# Patient Record
Sex: FEMALE | Race: BLACK OR AFRICAN AMERICAN | NOT HISPANIC OR LATINO | URBAN - METROPOLITAN AREA
[De-identification: names, ages, dates, MRNs, and addresses within clinical notes are randomized per-mention and may not be internally consistent; named-entity substitution may affect disease eponyms.]

---

## 2017-03-02 ENCOUNTER — HISTORICAL (OUTPATIENT)
Dept: RADIOLOGY | Facility: HOSPITAL | Age: 72
End: 2017-03-02

## 2018-11-07 ENCOUNTER — HISTORICAL (OUTPATIENT)
Dept: RADIOLOGY | Facility: HOSPITAL | Age: 73
End: 2018-11-07

## 2018-11-20 ENCOUNTER — HISTORICAL (OUTPATIENT)
Dept: RADIOLOGY | Facility: HOSPITAL | Age: 73
End: 2018-11-20

## 2019-12-05 ENCOUNTER — HISTORICAL (OUTPATIENT)
Dept: RADIOLOGY | Facility: HOSPITAL | Age: 74
End: 2019-12-05

## 2020-12-11 ENCOUNTER — HISTORICAL (OUTPATIENT)
Dept: RADIOLOGY | Facility: HOSPITAL | Age: 75
End: 2020-12-11

## 2021-12-15 ENCOUNTER — HISTORICAL (OUTPATIENT)
Dept: RADIOLOGY | Facility: HOSPITAL | Age: 76
End: 2021-12-15

## 2022-12-30 ENCOUNTER — HOSPITAL ENCOUNTER (OUTPATIENT)
Dept: RADIOLOGY | Facility: HOSPITAL | Age: 77
Discharge: HOME | End: 2022-12-30
Attending: OBSTETRICS & GYNECOLOGY
Payer: MEDICARE

## 2022-12-30 DIAGNOSIS — Z12.31 BREAST CANCER SCREENING BY MAMMOGRAM: ICD-10-CM

## 2022-12-30 PROCEDURE — 77063 MAMMO DIGITAL SCREENING BILAT WITH TOMO: ICD-10-PCS | Mod: 26,,, | Performed by: STUDENT IN AN ORGANIZED HEALTH CARE EDUCATION/TRAINING PROGRAM

## 2022-12-30 PROCEDURE — 77067 SCR MAMMO BI INCL CAD: CPT | Mod: 26,,, | Performed by: STUDENT IN AN ORGANIZED HEALTH CARE EDUCATION/TRAINING PROGRAM

## 2022-12-30 PROCEDURE — 77063 BREAST TOMOSYNTHESIS BI: CPT | Mod: 26,,, | Performed by: STUDENT IN AN ORGANIZED HEALTH CARE EDUCATION/TRAINING PROGRAM

## 2022-12-30 PROCEDURE — 77067 MAMMO DIGITAL SCREENING BILAT WITH TOMO: ICD-10-PCS | Mod: 26,,, | Performed by: STUDENT IN AN ORGANIZED HEALTH CARE EDUCATION/TRAINING PROGRAM

## 2022-12-30 PROCEDURE — 77063 BREAST TOMOSYNTHESIS BI: CPT | Mod: TC

## 2024-12-16 ENCOUNTER — HOSPITAL ENCOUNTER (EMERGENCY)
Facility: HOSPITAL | Age: 79
Discharge: HOME | End: 2024-12-16
Attending: EMERGENCY MEDICINE
Payer: MEDICARE

## 2024-12-16 VITALS
DIASTOLIC BLOOD PRESSURE: 100 MMHG | HEIGHT: 67 IN | SYSTOLIC BLOOD PRESSURE: 158 MMHG | BODY MASS INDEX: 33.27 KG/M2 | WEIGHT: 212 LBS | RESPIRATION RATE: 18 BRPM | TEMPERATURE: 98 F | HEART RATE: 100 BPM | OXYGEN SATURATION: 98 %

## 2024-12-16 DIAGNOSIS — M25.561 BILATERAL KNEE PAIN: ICD-10-CM

## 2024-12-16 DIAGNOSIS — M25.562 BILATERAL KNEE PAIN: ICD-10-CM

## 2024-12-16 DIAGNOSIS — M17.0 BILATERAL PRIMARY OSTEOARTHRITIS OF KNEE: Primary | ICD-10-CM

## 2024-12-16 PROCEDURE — 63600175 PHARM REV CODE 636 W HCPCS: Performed by: EMERGENCY MEDICINE

## 2024-12-16 PROCEDURE — 96372 THER/PROPH/DIAG INJ SC/IM: CPT

## 2024-12-16 PROCEDURE — 96372 THER/PROPH/DIAG INJ SC/IM: CPT | Performed by: EMERGENCY MEDICINE

## 2024-12-16 PROCEDURE — 99284 EMERGENCY DEPT VISIT MOD MDM: CPT | Mod: 25

## 2024-12-16 RX ORDER — PAROXETINE HYDROCHLORIDE 40 MG/1
40 TABLET, FILM COATED ORAL DAILY
COMMUNITY
Start: 2024-12-11

## 2024-12-16 RX ORDER — GLIMEPIRIDE 4 MG/1
4 TABLET ORAL 2 TIMES DAILY
COMMUNITY

## 2024-12-16 RX ORDER — MELOXICAM 7.5 MG/1
7.5 TABLET ORAL DAILY
Qty: 14 TABLET | Refills: 0 | Status: SHIPPED | OUTPATIENT
Start: 2024-12-16 | End: 2024-12-30

## 2024-12-16 RX ORDER — QUETIAPINE FUMARATE 25 MG/1
25-50 TABLET, FILM COATED ORAL NIGHTLY
COMMUNITY

## 2024-12-16 RX ORDER — LORAZEPAM 1 MG/1
1 TABLET ORAL 2 TIMES DAILY
COMMUNITY
Start: 2024-12-11

## 2024-12-16 RX ORDER — KETOROLAC TROMETHAMINE 30 MG/ML
30 INJECTION, SOLUTION INTRAMUSCULAR; INTRAVENOUS
Status: COMPLETED | OUTPATIENT
Start: 2024-12-16 | End: 2024-12-16

## 2024-12-16 RX ORDER — ESCITALOPRAM OXALATE 20 MG/1
20 TABLET ORAL
COMMUNITY
Start: 2021-12-29

## 2024-12-16 RX ORDER — HYDROCODONE BITARTRATE AND ACETAMINOPHEN 5; 325 MG/1; MG/1
1 TABLET ORAL EVERY 6 HOURS PRN
Qty: 20 TABLET | Refills: 0 | Status: SHIPPED | OUTPATIENT
Start: 2024-12-16 | End: 2024-12-21

## 2024-12-16 RX ORDER — GABAPENTIN 400 MG/1
400 CAPSULE ORAL 3 TIMES DAILY
COMMUNITY

## 2024-12-16 RX ADMIN — KETOROLAC TROMETHAMINE 30 MG: 30 INJECTION, SOLUTION INTRAMUSCULAR at 02:12

## 2024-12-16 NOTE — ED TRIAGE NOTES
Pt complaint of recurrent bilateral knee pain over the past few months that is not improving. Pt denies recent  injury

## 2024-12-16 NOTE — ED PROVIDER NOTES
Encounter Date: 12/16/2024       History     Chief Complaint   Patient presents with    Knee Pain     Pt complaint of recurrent bilateral knee pain over the past few months that is not improving. Pt denies recent  injury     The history is provided by the patient.   Knee Pain  This is a chronic problem. The current episode started more than 1 week ago. The problem occurs constantly. The problem has been gradually worsening. Pertinent negatives include no chest pain and no shortness of breath. The symptoms are aggravated by walking and standing. Nothing relieves the symptoms. She has tried ASA for the symptoms. The treatment provided no relief.   Has not seen anyone for her knee pain, citing her ill  that she must tend to at home.    Review of patient's allergies indicates:  No Known Allergies  Past Medical History:   Diagnosis Date    Diabetes mellitus      History reviewed. No pertinent surgical history.  No family history on file.  Social History     Tobacco Use    Smoking status: Never     Passive exposure: Never    Smokeless tobacco: Never   Substance Use Topics    Alcohol use: Never    Drug use: Never     Review of Systems   Constitutional:  Negative for fever.   HENT:  Negative for sore throat.    Respiratory:  Negative for shortness of breath.    Cardiovascular:  Negative for chest pain.   Gastrointestinal:  Negative for nausea.   Genitourinary:  Negative for dysuria.   Musculoskeletal:  Negative for back pain.   Skin:  Negative for rash.   Neurological:  Negative for weakness.   Hematological:  Does not bruise/bleed easily.       Physical Exam     Initial Vitals [12/16/24 1251]   BP Pulse Resp Temp SpO2   (!) 158/100 100 18 98.2 °F (36.8 °C) 98 %      MAP       --         Physical Exam    Nursing note and vitals reviewed.  Constitutional: She appears well-developed and well-nourished.   HENT:   Head: Normocephalic and atraumatic.   Right Ear: External ear normal.   Left Ear: External ear normal.    Eyes: Conjunctivae and EOM are normal. Pupils are equal, round, and reactive to light.   Neck: Neck supple.   Normal range of motion.  Cardiovascular:  Normal rate, regular rhythm, normal heart sounds and intact distal pulses.           Pulmonary/Chest: Breath sounds normal.   Abdominal: Abdomen is soft. Bowel sounds are normal.   obese   Musculoskeletal:         General: Normal range of motion.      Cervical back: Normal range of motion and neck supple.      Right knee: Deformity (valgus) present.      Left knee: Deformity (valgus) present.     Neurological: She is alert and oriented to person, place, and time. GCS score is 15. GCS eye subscore is 4. GCS verbal subscore is 5. GCS motor subscore is 6.   Skin: Skin is warm and dry. Capillary refill takes less than 2 seconds.   Psychiatric: She has a normal mood and affect. Her behavior is normal. Judgment and thought content normal.         ED Course   Procedures  Labs Reviewed - No data to display       Imaging Results              X-Ray Knee AP Standing Bilateral (Final result)  Result time 12/16/24 14:29:06      Final result by Ciro Worthington MD (12/16/24 14:29:06)                   Impression:      Degenerative changes.      Electronically signed by: Ciro Worthington  Date:    12/16/2024  Time:    14:29               Narrative:    EXAMINATION:  XR KNEE AP STANDING BILATERAL    CLINICAL HISTORY:  Pain in right knee    COMPARISON:  None.    FINDINGS:  No acute displaced fractures or dislocations.    There is narrowing of the medial compartment of both knee joints more so on the right than on the left articular spaces are otherwise preserved with smooth articular surfaces    No blastic or lytic lesions.    Soft tissues within normal limits.                                       Medications   ketorolac injection 30 mg (30 mg Intramuscular Given 12/16/24 4869)     Medical Decision Making  Amount and/or Complexity of Data Reviewed  External Data Reviewed:  radiology.     Details: X-ray report from May 2024:    Impression    Right knee moderate medial compartment osteoarthritis.  Mild bilateral patellofemoral compartment arthritis.  Chondrocalcinosis.  Narrative    History: Bilateral knee pain for months  Bilateral knee views: Frontal/lateral/sunrise    FINDINGS:    Joint space narrowing is moderate in the medial compartment of the right knee, with marginal osteophytes. The left knee lateral meniscus is calcified. No patellar tilt or subluxation is observed. There is no knee joint effusion or radiopaque foreign body. Subtle marginal patellar osteophytes are present bilaterally.  Exam End: 05/06/24 13:25 Last Resulted: 05/06/24 13:32  Received From: [facility]  Result Received: 12/16/24 12:31      Radiology: ordered and independent interpretation performed. Decision-making details documented in ED Course.    Risk  Prescription drug management.    Differential includes:  OA, RA, gout, pseudogout, meniscus injury.  Will obtain standing bilateral knee x-rays and give IM ketorolac for pain.  Plan to D/C with symptomatic treatment and refer to ortho.           ED Course as of 12/16/24 1440   Mon Dec 16, 2024   1436 X-rays personally reviewed by me - she is bone-on-bone in the right knee and moderate to advanced on the left.  Will D/C with analgesics and refer to ortho. [CL]      ED Course User Index  [CL] Swapnil Garcia MD                           Clinical Impression:  Final diagnoses:  [M25.561, M25.562] Bilateral knee pain  [M17.0] Bilateral primary osteoarthritis of knee (Primary)          ED Disposition Condition    Discharge Stable          ED Prescriptions       Medication Sig Dispense Start Date End Date Auth. Provider    meloxicam (MOBIC) 7.5 MG tablet Take 1 tablet (7.5 mg total) by mouth once daily. for 14 days 14 tablet 12/16/2024 12/30/2024 Swapnil Garcia MD     HYDROcodone-acetaminophen (NORCO) 5-325 mg per tablet Take 1 tablet by mouth every 6 (six) hours as needed for Pain. Final diagnoses: [M25.561, M25.562] Bilateral knee pain [M17.0] Bilateral primary osteoarthritis of knee (Primary) 20 tablet 12/16/2024 12/21/2024 Swapnil Garcia MD          Follow-up Information       Follow up With Specialties Details Why Contact Info    Eugene Collado MD Orthopedic Surgery Schedule an appointment as soon as possible for a visit   [address] [facility]  [address]  [place] [address]  [phone]               Swapnil Garcia MD  12/16/24 9741

## 2025-01-27 ENCOUNTER — HOSPITAL ENCOUNTER (OUTPATIENT)
Dept: RADIOLOGY | Facility: CLINIC | Age: 80
Discharge: HOME | End: 2025-01-27
Attending: ORTHOPAEDIC SURGERY
Payer: MEDICARE

## 2025-01-27 ENCOUNTER — OFFICE VISIT (OUTPATIENT)
Dept: ORTHOPEDICS | Facility: CLINIC | Age: 80
End: 2025-01-27
Payer: MEDICARE

## 2025-01-27 VITALS — HEIGHT: 67 IN | BODY MASS INDEX: 33.27 KG/M2 | WEIGHT: 212 LBS

## 2025-01-27 DIAGNOSIS — M25.561 PAIN IN BOTH KNEES, UNSPECIFIED CHRONICITY: Primary | ICD-10-CM

## 2025-01-27 DIAGNOSIS — M25.561 PAIN IN BOTH KNEES, UNSPECIFIED CHRONICITY: ICD-10-CM

## 2025-01-27 DIAGNOSIS — M25.562 PAIN IN BOTH KNEES, UNSPECIFIED CHRONICITY: Primary | ICD-10-CM

## 2025-01-27 DIAGNOSIS — M25.562 PAIN IN BOTH KNEES, UNSPECIFIED CHRONICITY: ICD-10-CM

## 2025-01-27 DIAGNOSIS — M17.0 BILATERAL PRIMARY OSTEOARTHRITIS OF KNEE: ICD-10-CM

## 2025-01-27 PROCEDURE — 20610 DRAIN/INJ JOINT/BURSA W/O US: CPT | Mod: LT,,, | Performed by: ORTHOPAEDIC SURGERY

## 2025-01-27 PROCEDURE — 99204 OFFICE O/P NEW MOD 45 MIN: CPT | Mod: 25,,, | Performed by: ORTHOPAEDIC SURGERY

## 2025-01-27 PROCEDURE — 73564 X-RAY EXAM KNEE 4 OR MORE: CPT | Mod: 50,,, | Performed by: ORTHOPAEDIC SURGERY

## 2025-01-27 RX ORDER — ALBUTEROL SULFATE 90 UG/1
INHALANT RESPIRATORY (INHALATION)
COMMUNITY
Start: 2022-01-03

## 2025-01-27 RX ORDER — METHYLPREDNISOLONE ACETATE 80 MG/ML
80 INJECTION, SUSPENSION INTRA-ARTICULAR; INTRALESIONAL; INTRAMUSCULAR; SOFT TISSUE
Status: DISCONTINUED | OUTPATIENT
Start: 2025-01-27 | End: 2025-01-27 | Stop reason: HOSPADM

## 2025-01-27 RX ORDER — ASPIRIN 325 MG
1250 TABLET, DELAYED RELEASE (ENTERIC COATED) ORAL
COMMUNITY
Start: 2021-12-29

## 2025-01-27 RX ORDER — LIDOCAINE HYDROCHLORIDE 20 MG/ML
2 INJECTION, SOLUTION INFILTRATION; PERINEURAL
Status: DISCONTINUED | OUTPATIENT
Start: 2025-01-27 | End: 2025-01-27 | Stop reason: HOSPADM

## 2025-01-27 RX ORDER — CELECOXIB 200 MG/1
200 CAPSULE ORAL
COMMUNITY
Start: 2021-12-29

## 2025-01-27 RX ADMIN — METHYLPREDNISOLONE ACETATE 80 MG: 80 INJECTION, SUSPENSION INTRA-ARTICULAR; INTRALESIONAL; INTRAMUSCULAR; SOFT TISSUE at 09:01

## 2025-01-27 RX ADMIN — LIDOCAINE HYDROCHLORIDE 2 MG: 20 INJECTION, SOLUTION INFILTRATION; PERINEURAL at 09:01

## 2025-01-27 NOTE — PROCEDURES
Large Joint Aspiration/Injection: L knee    Date/Time: 1/27/2025 9:00 AM    Performed by: Eugene Collado MD  Authorized by: Eugene Collado MD    Consent Done?:  Yes (Verbal)  Indications:  Pain  Site marked: the procedure site was marked    Prep: patient was prepped and draped in usual sterile fashion    Approach:  Anterolateral  Location:  Knee  Site:  L knee  Medications:  2 mg LIDOcaine HCL 20 mg/ml (2%) 20 mg/mL (2 %); 80 mg methylPREDNISolone acetate 80 mg/mL  Patient tolerance:  Patient tolerated the procedure well with no immediate complications

## 2025-01-27 NOTE — PROGRESS NOTES
"Subjective:    CC: Pain of the Left Knee and Pain of the Right Knee (WILILAN knee pain. Started hurting 6 months ago. Pain is constant and doesn't radiate. Give out sometimes when she walks. Has numbness and tingling. Swells up at times. Not taking any meds for the pain. No prior treatments. No other concerns with them.)       HPI:  Patient comes in today complaining of bilateral knee pain left greater than right.  Patient states it started 6 months ago.  She states it is bothersome when she does rest and ambulate.  She is a minimal ambulator due to the continued knee pain.  He is presently with family.  She denies any trauma she denies any other complaints.    ROS: Refer to HPI for pertinent ROS. All other 12 point systems negative.    Objective:  Vitals:    01/27/25 0919   Weight: 96.2 kg (212 lb)   Height: 5' 7" (1.702 m)        Physical Exam:  The patient is well-nourished, well-developed, in no apparent distress, pleasant and cooperative. Examination of the bilateral lower extremities,  compartments are soft and warm. Skin is intact. There are no signs or symptoms of DVT or infection. There is a small joint effusion. There is no erythema. Tenderness to palpation along the medial joint line and patellofemoral joint bilaterally, right knee range of motion is 0-90; left knee range of motion is 0-90. The knee is stable to stressing with varus and valgus. Negative anterior and posterior drawer.   Negative Lachman´s.  Negative Robert's test. Patella grind is positive, negative for apprehension.  Patient is neurovascularly intact distally.      Images:  X-rays four views left and right knee demonstrates bone-on-bone arthritis right knee, near bone-on-bone arthritis left knee. Images Reviewed and discussed with patient.    Assessment:  1. Bilateral primary osteoarthritis of knee  - Ambulatory referral/consult to Orthopedics  - Large Joint Aspiration/Injection: L knee    2. Pain in both knees, unspecified chronicity  - " X-Ray Knee Complete 4 Or More Views Bilat; Future        Plan:  At this time we discussed her physical exam and x-ray findings.  We have discussed her underlying arthritis.  We have discussed some formal therapy, she was given a pamphlet on knee range of motion strengthening exercises.  He tolerated the steroid injection very well through the anterolateral portal of the left knee.  I would like see back in a month to see how she is progressing.  We have discussed a dressing of the right knee at that time if needed.  We have also discussed a total knee arthroplasty.    Follow UP: No follow-ups on file.    Large Joint Aspiration/Injection: L knee    Date/Time: 1/27/2025 9:00 AM    Performed by: Eugene Collado MD  Authorized by: Eugene Collado MD    Consent Done?:  Yes (Verbal)  Indications:  Pain  Site marked: the procedure site was marked    Prep: patient was prepped and draped in usual sterile fashion    Approach:  Anterolateral  Location:  Knee  Site:  L knee  Medications:  2 mg LIDOcaine HCL 20 mg/ml (2%) 20 mg/mL (2 %); 80 mg methylPREDNISolone acetate 80 mg/mL  Patient tolerance:  Patient tolerated the procedure well with no immediate complications

## 2025-03-07 ENCOUNTER — TELEPHONE (OUTPATIENT)
Dept: ORTHOPEDICS | Facility: CLINIC | Age: 80
End: 2025-03-07
Payer: MEDICARE

## 2025-03-07 NOTE — TELEPHONE ENCOUNTER
Patient's daughter called, Patient missed her appointment 3/3/25 She rescheduled for 3/31. Patient is having a lot of knee pain and wanted a steriod inj. I explained to pt that she can only have those every 3 months. She also inquired about gel injections. She is still anti surgery. But will discuss the gel inj options for both knees at upcoming appointment.

## 2025-03-31 ENCOUNTER — OFFICE VISIT (OUTPATIENT)
Dept: ORTHOPEDICS | Facility: CLINIC | Age: 80
End: 2025-03-31
Payer: MEDICARE

## 2025-03-31 VITALS
WEIGHT: 206.63 LBS | SYSTOLIC BLOOD PRESSURE: 153 MMHG | DIASTOLIC BLOOD PRESSURE: 96 MMHG | HEART RATE: 93 BPM | BODY MASS INDEX: 32.43 KG/M2 | HEIGHT: 67 IN

## 2025-03-31 DIAGNOSIS — M17.11 LOCALIZED OSTEOARTHRITIS OF RIGHT KNEE: Primary | ICD-10-CM

## 2025-03-31 PROCEDURE — 99213 OFFICE O/P EST LOW 20 MIN: CPT | Mod: ,,, | Performed by: ORTHOPAEDIC SURGERY

## 2025-03-31 NOTE — PROGRESS NOTES
"Subjective:    CC: Knee Pain (Bishop knee pain - Last cortisone inj Lt knee 1/27/25 didn't really help. Pt states Rt knee pain is worse. Reports Rt knee pops in/out of place when she walks. Lt knee having pain right under patella. Pt wants to get gel injections set up. Swelling in the Rt knee, uses cold compress. )       HPI:  Patient returns today for repeat exam.  Patient had injections in her knee at last visit, she states her right knee is the worse.  Patient states she only got a couple of days of released.  She continues to have some popping in her knee.  She does take care of her  who was at home, she does have help from her daughter.  She denies any new trauma or other injuries.  She has tried multiple conservative treatments without relief.    ROS: Refer to HPI for pertinent ROS. All other 12 point systems negative.    Objective:  Vitals:    03/31/25 0942   BP: (!) 153/96  Comment: Pt in pain today   BP Location: Right arm   Patient Position: Sitting   Pulse: 93   Weight: 93.7 kg (206 lb 9.6 oz)   Height: 5' 7" (1.702 m)        Physical Exam:  The patient is well-nourished, well-developed and in no apparent distress, pleasant and cooperative. Examination of the right lower extremity compartments are soft and warm. Skin is intact. There are no signs or symptoms of DVT or infection. There is no obvious joint effusion. There is no erythema. Tender to palpation along the medial joint line and patellofemoral joint , right knee range of motion is 0-100, varus deformity. The knee is stable to exam with varus and valgus stressing. Negative anterior and posterior drawer. Negative Lachman´s.  Negative Robert's test. Patella grind is positive, Negative for apprehension. Neurovascularly intact distally.    Images: . Images Reviewed and discussed with patient.    Assessment:  1. Localized osteoarthritis of right knee        Plan:  At this time we discussed her physical exam and previous imaging findings.  We have " discussed her bone-on-bone arthritis.  We have discussed additional conservative treatment surgical intervention.  We have discussed a total joint class.  I would like see him back in 2 weeks after this class to further discuss options going forward.    Follow UP: No follow-ups on file.

## 2025-06-19 ENCOUNTER — OFFICE VISIT (OUTPATIENT)
Dept: ORTHOPEDICS | Facility: CLINIC | Age: 80
End: 2025-06-19
Payer: MEDICARE

## 2025-06-19 VITALS
HEART RATE: 86 BPM | BODY MASS INDEX: 32.42 KG/M2 | SYSTOLIC BLOOD PRESSURE: 147 MMHG | HEIGHT: 67 IN | WEIGHT: 206.56 LBS | DIASTOLIC BLOOD PRESSURE: 81 MMHG

## 2025-06-19 DIAGNOSIS — M17.0 BILATERAL PRIMARY OSTEOARTHRITIS OF KNEE: Primary | ICD-10-CM

## 2025-06-19 RX ORDER — LIDOCAINE HYDROCHLORIDE 20 MG/ML
2 INJECTION, SOLUTION INFILTRATION; PERINEURAL
Status: DISCONTINUED | OUTPATIENT
Start: 2025-06-19 | End: 2025-06-19 | Stop reason: HOSPADM

## 2025-06-19 RX ORDER — METHYLPREDNISOLONE ACETATE 80 MG/ML
80 INJECTION, SUSPENSION INTRA-ARTICULAR; INTRALESIONAL; INTRAMUSCULAR; SOFT TISSUE
Status: DISCONTINUED | OUTPATIENT
Start: 2025-06-19 | End: 2025-06-19 | Stop reason: HOSPADM

## 2025-06-19 RX ADMIN — LIDOCAINE HYDROCHLORIDE 2 MG: 20 INJECTION, SOLUTION INFILTRATION; PERINEURAL at 10:06

## 2025-06-19 RX ADMIN — METHYLPREDNISOLONE ACETATE 80 MG: 80 INJECTION, SUSPENSION INTRA-ARTICULAR; INTRALESIONAL; INTRAMUSCULAR; SOFT TISSUE at 10:06

## 2025-06-19 NOTE — PROCEDURES
Large Joint Aspiration/Injection: L knee    Date/Time: 6/19/2025 10:45 AM    Performed by: Eugene Collado MD  Authorized by: Eugene Collado MD    Consent Done?:  Yes (Verbal)  Indications:  Pain  Site marked: the procedure site was marked    Prep: patient was prepped and draped in usual sterile fashion    Approach:  Anterolateral  Location:  Knee  Site:  L knee  Medications:  2 mg LIDOcaine HCL 20 mg/ml (2%) 20 mg/mL (2 %); 80 mg methylPREDNISolone acetate 80 mg/mL  Patient tolerance:  Patient tolerated the procedure well with no immediate complications

## 2025-06-19 NOTE — PROGRESS NOTES
"Subjective:    CC: Pain of the Right Knee (Bilateral knee pain - pt states that the left is a little worse than the right. Previous injections on 1/27/25 - she is not ready for TKA, would like to see if she could have injections. Here with family. ) and Pain of the Left Knee       HPI:  Today for repeat exam.  Patient continues to have bilateral knee pain left greater than right.  She has pain while walking bending of her knee.  She denies any trauma or specific injury, she states she is not ready for surgery, she denies other complaints.    ROS: Refer to HPI for pertinent ROS. All other 12 point systems negative.    Objective:  Vitals:    06/19/25 1058   BP: (!) 147/81   Pulse: 86   Weight: 93.7 kg (206 lb 9.1 oz)   Height: 5' 7" (1.702 m)        Physical Exam:  The patient is well-nourished, well-developed, in no apparent distress, pleasant and cooperative. Examination of the bilateral lower extremities,  compartments are soft and warm. Skin is intact. There are no signs or symptoms of DVT or infection. There is a small joint effusion. There is no erythema. Tenderness to palpation along the medial joint line and patellofemoral joint bilaterally, right knee range of motion is 0-100; left knee range of motion is 5-90. The knee is stable to stressing with varus and valgus. Negative anterior and posterior drawer.   Negative Lachman´s.  Negative Robert's test. Patella grind is positive, negative for apprehension.  Patient is neurovascularly intact distally.      Images: . Images Reviewed and discussed with patient.    Assessment:  1. Bilateral primary osteoarthritis of knee  - Large Joint Aspiration/Injection: L knee        Plan:  At this time we discussed her physical exam and previous imaging findings.  We have discussed various treatment options going forward.  Under sterile technique she tolerated the steroid injection very well through the anterolateral portal of the left knee.  She will watch her blood sugars " closely.  I would like see back in 2 weeks' possible injection of the right knee at that time if needed.    Follow UP: No follow-ups on file.    Large Joint Aspiration/Injection: L knee    Date/Time: 6/19/2025 10:45 AM    Performed by: Eugene Collado MD  Authorized by: Eugene Collado MD    Consent Done?:  Yes (Verbal)  Indications:  Pain  Site marked: the procedure site was marked    Prep: patient was prepped and draped in usual sterile fashion    Approach:  Anterolateral  Location:  Knee  Site:  L knee  Medications:  2 mg LIDOcaine HCL 20 mg/ml (2%) 20 mg/mL (2 %); 80 mg methylPREDNISolone acetate 80 mg/mL  Patient tolerance:  Patient tolerated the procedure well with no immediate complications

## 2025-07-03 ENCOUNTER — OFFICE VISIT (OUTPATIENT)
Dept: ORTHOPEDICS | Facility: CLINIC | Age: 80
End: 2025-07-03
Payer: MEDICARE

## 2025-07-03 VITALS
BODY MASS INDEX: 32.36 KG/M2 | DIASTOLIC BLOOD PRESSURE: 83 MMHG | HEART RATE: 103 BPM | SYSTOLIC BLOOD PRESSURE: 145 MMHG | WEIGHT: 206.19 LBS | HEIGHT: 67 IN

## 2025-07-03 DIAGNOSIS — M17.11 LOCALIZED OSTEOARTHRITIS OF RIGHT KNEE: Primary | ICD-10-CM

## 2025-07-03 RX ORDER — METHYLPREDNISOLONE ACETATE 80 MG/ML
80 INJECTION, SUSPENSION INTRA-ARTICULAR; INTRALESIONAL; INTRAMUSCULAR; SOFT TISSUE
Status: DISCONTINUED | OUTPATIENT
Start: 2025-07-03 | End: 2025-07-03 | Stop reason: HOSPADM

## 2025-07-03 RX ORDER — LIDOCAINE HYDROCHLORIDE 20 MG/ML
2 INJECTION, SOLUTION INFILTRATION; PERINEURAL
Status: DISCONTINUED | OUTPATIENT
Start: 2025-07-03 | End: 2025-07-03 | Stop reason: HOSPADM

## 2025-07-03 RX ADMIN — LIDOCAINE HYDROCHLORIDE 2 MG: 20 INJECTION, SOLUTION INFILTRATION; PERINEURAL at 01:07

## 2025-07-03 RX ADMIN — METHYLPREDNISOLONE ACETATE 80 MG: 80 INJECTION, SUSPENSION INTRA-ARTICULAR; INTRALESIONAL; INTRAMUSCULAR; SOFT TISSUE at 01:07

## 2025-07-03 NOTE — PROGRESS NOTES
"Subjective:    CC: Injections (here for RIGHT knee injection today, 2 week F/u LEFT knee inj 6/19/25 with some relief for 2-3 days and it wore off)       HPI:  Patient returns today for repeat exam.  Patient had a previous injection in his left knee, she states her right knee continues to have pain swelling loss of motion.  She is ready to proceed with her injection.  She denies any new trauma or other injury.    ROS: Refer to HPI for pertinent ROS. All other 12 point systems negative.    Objective:  Vitals:    07/03/25 1238 07/03/25 1242   BP: (!) 157/81 (!) 145/83   BP Location: Left forearm Left forearm   Patient Position:  Sitting   Pulse: 108 103   Weight: 93.5 kg (206 lb 3.2 oz)    Height: 5' 7" (1.702 m)         Physical Exam:  The patient is well-nourished, well-developed and in no apparent distress, pleasant and cooperative. Examination of the right lower extremity compartments are soft and warm. Skin is intact. There are no signs or symptoms of DVT or infection. There is no obvious joint effusion. There is no erythema. Tender to palpation along the medial joint line and patellofemoral joint , right knee range of motion is 5-100. The knee is stable to exam with varus and valgus stressing. Negative anterior and posterior drawer. Negative Lachman´s.  Negative Robert's test. Patella grind is positive, Negative for apprehension. Neurovascularly intact distally.    Images: . Images Reviewed and discussed with patient.    Assessment:  1. Localized osteoarthritis of right knee  - Large Joint Aspiration/Injection: R knee        Plan:  At this time we discussed her physical exam and previous imaging findings.  Under sterile technique she tolerated the steroid injection very well through the anterolateral portal of the right knee.  She will continue low-impact activities, I would like see back in 3 months if needed.    Follow UP: No follow-ups on file.    Large Joint Aspiration/Injection: R knee    Date/Time: " 7/3/2025 1:15 PM    Performed by: Eugene Collado MD  Authorized by: Eugene Collado MD    Consent Done?:  Yes (Verbal)  Indications:  Pain  Site marked: the procedure site was marked    Prep: patient was prepped and draped in usual sterile fashion    Approach:  Anterolateral  Location:  Knee  Site:  R knee  Medications:  2 mg LIDOcaine HCL 20 mg/ml (2%) 20 mg/mL (2 %); 80 mg methylPREDNISolone acetate 80 mg/mL  Patient tolerance:  Patient tolerated the procedure well with no immediate complications

## 2025-07-03 NOTE — PROCEDURES
Large Joint Aspiration/Injection: R knee    Date/Time: 7/3/2025 1:15 PM    Performed by: Eugene Collado MD  Authorized by: Eugene Collado MD    Consent Done?:  Yes (Verbal)  Indications:  Pain  Site marked: the procedure site was marked    Prep: patient was prepped and draped in usual sterile fashion    Approach:  Anterolateral  Location:  Knee  Site:  R knee  Medications:  2 mg LIDOcaine HCL 20 mg/ml (2%) 20 mg/mL (2 %); 80 mg methylPREDNISolone acetate 80 mg/mL  Patient tolerance:  Patient tolerated the procedure well with no immediate complications